# Patient Record
Sex: FEMALE | Race: WHITE | URBAN - METROPOLITAN AREA
[De-identification: names, ages, dates, MRNs, and addresses within clinical notes are randomized per-mention and may not be internally consistent; named-entity substitution may affect disease eponyms.]

---

## 2021-01-08 ENCOUNTER — OUT OF OFFICE VISIT (OUTPATIENT)
Dept: URBAN - METROPOLITAN AREA MEDICAL CENTER 34 | Facility: MEDICAL CENTER | Age: 71
End: 2021-01-08
Payer: MEDICARE

## 2021-01-08 DIAGNOSIS — R11.2 ACUTE NAUSEA WITH NONBILIOUS VOMITING: ICD-10-CM

## 2021-01-08 DIAGNOSIS — R10.84 ABDOMINAL CRAMPING, GENERALIZED: ICD-10-CM

## 2021-01-08 DIAGNOSIS — R93.3 ABN FINDINGS-GI TRACT: ICD-10-CM

## 2021-01-08 DIAGNOSIS — D72.829 ELEVATED WBC COUNT: ICD-10-CM

## 2021-01-08 PROCEDURE — G8427 DOCREV CUR MEDS BY ELIG CLIN: HCPCS | Performed by: INTERNAL MEDICINE

## 2021-01-08 PROCEDURE — 99221 1ST HOSP IP/OBS SF/LOW 40: CPT | Performed by: INTERNAL MEDICINE

## 2021-01-09 ENCOUNTER — OUT OF OFFICE VISIT (OUTPATIENT)
Dept: URBAN - METROPOLITAN AREA MEDICAL CENTER 34 | Facility: MEDICAL CENTER | Age: 71
End: 2021-01-09
Payer: MEDICARE

## 2021-01-09 DIAGNOSIS — K52.89 (LYMPHOCYTIC) MICROSCOPIC COLITIS: ICD-10-CM

## 2021-01-09 PROCEDURE — 99232 SBSQ HOSP IP/OBS MODERATE 35: CPT | Performed by: INTERNAL MEDICINE

## 2021-01-12 ENCOUNTER — OUT OF OFFICE VISIT (OUTPATIENT)
Dept: URBAN - METROPOLITAN AREA MEDICAL CENTER 34 | Facility: MEDICAL CENTER | Age: 71
End: 2021-01-12
Payer: MEDICARE

## 2021-01-12 DIAGNOSIS — K52.89 (LYMPHOCYTIC) MICROSCOPIC COLITIS: ICD-10-CM

## 2021-01-12 PROCEDURE — 99232 SBSQ HOSP IP/OBS MODERATE 35: CPT | Performed by: INTERNAL MEDICINE

## 2021-01-13 ENCOUNTER — OUT OF OFFICE VISIT (OUTPATIENT)
Dept: URBAN - METROPOLITAN AREA MEDICAL CENTER 34 | Facility: MEDICAL CENTER | Age: 71
End: 2021-01-13
Payer: MEDICARE

## 2021-01-13 DIAGNOSIS — K52.89 (LYMPHOCYTIC) MICROSCOPIC COLITIS: ICD-10-CM

## 2021-01-13 PROCEDURE — 45380 COLONOSCOPY AND BIOPSY: CPT | Performed by: INTERNAL MEDICINE

## 2021-01-19 ENCOUNTER — TELEPHONE ENCOUNTER (OUTPATIENT)
Dept: URBAN - METROPOLITAN AREA CLINIC 94 | Facility: CLINIC | Age: 71
End: 2021-01-19

## 2021-01-21 ENCOUNTER — WEB ENCOUNTER (OUTPATIENT)
Dept: URBAN - METROPOLITAN AREA CLINIC 94 | Facility: CLINIC | Age: 71
End: 2021-01-21

## 2021-01-21 ENCOUNTER — DASHBOARD ENCOUNTERS (OUTPATIENT)
Age: 71
End: 2021-01-21

## 2021-01-21 ENCOUNTER — OFFICE VISIT (OUTPATIENT)
Dept: URBAN - METROPOLITAN AREA CLINIC 94 | Facility: CLINIC | Age: 71
End: 2021-01-21
Payer: MEDICARE

## 2021-01-21 VITALS
WEIGHT: 151 LBS | HEIGHT: 65 IN | BODY MASS INDEX: 25.16 KG/M2 | TEMPERATURE: 97.3 F | SYSTOLIC BLOOD PRESSURE: 115 MMHG | HEART RATE: 88 BPM | DIASTOLIC BLOOD PRESSURE: 77 MMHG

## 2021-01-21 DIAGNOSIS — K52.9 COLITIS: ICD-10-CM

## 2021-01-21 DIAGNOSIS — R50.9 FEVER: ICD-10-CM

## 2021-01-21 DIAGNOSIS — R19.7 DIARRHEA: ICD-10-CM

## 2021-01-21 PROCEDURE — G9903 PT SCRN TBCO ID AS NON USER: HCPCS | Performed by: PHYSICIAN ASSISTANT

## 2021-01-21 PROCEDURE — G8417 CALC BMI ABV UP PARAM F/U: HCPCS | Performed by: PHYSICIAN ASSISTANT

## 2021-01-21 PROCEDURE — 3017F COLORECTAL CA SCREEN DOC REV: CPT | Performed by: PHYSICIAN ASSISTANT

## 2021-01-21 PROCEDURE — G8427 DOCREV CUR MEDS BY ELIG CLIN: HCPCS | Performed by: PHYSICIAN ASSISTANT

## 2021-01-21 PROCEDURE — 99203 OFFICE O/P NEW LOW 30 MIN: CPT | Performed by: PHYSICIAN ASSISTANT

## 2021-01-21 PROCEDURE — G8484 FLU IMMUNIZE NO ADMIN: HCPCS | Performed by: PHYSICIAN ASSISTANT

## 2021-01-21 RX ORDER — CIPROFLOXACIN HYDROCHLORIDE 500 MG/1
1 TABLET TABLET, FILM COATED ORAL
Qty: 20 TABLET | Refills: 0 | OUTPATIENT
Start: 2021-01-21 | End: 2021-01-31

## 2021-01-21 RX ORDER — METRONIDAZOLE 500 MG/1
1 TABLET TABLET, FILM COATED ORAL THREE TIMES A DAY
Qty: 30 TABLET | Refills: 0 | OUTPATIENT
Start: 2021-01-21 | End: 2021-01-31

## 2021-01-21 NOTE — HPI-TODAY'S VISIT:
The patient is being evaluated today as a [facility] [place] 2[place] hospital f/u. The patient is accompanied by her daughter. Pt reported a hx of acute onset of abdominal pain, with nausea, and vomiting, and was admitted with intractable abdominal pain. Patient reports that pain had an abrupt onset, deep pain, unable to describe her pain, only that it is a 10/10, constant with waves of intensity. Pain is diffuse, more pronounced to lower abdomen. She then developed diarrhea every hour with fevers. CT revealed colitis Patient was prescribed Flagyl and Cipro in hospital  Colonoscopy 10cm area of erythema, granularity, friability in the mid-transverse colon of unclear etiology s/p biopsy Rest of colonic mucosa normal, with biopsies taken of the cecum/AC, and left colon Internal hemorrhoids Bx Mild to moderate active colitis. Diagnostic considerations for this histologic pattern include infection (Salmonella, Shigella, Aeromonas), ischemia and inflammatory bowel disease   The patient states that since discharge the abdominal pain has not returned but the diarrhea did. She is having a BM every hour. She also reports fever as high as 102 which occur every night. She is taking Tylenol and Ibuprofen

## 2021-01-21 NOTE — PHYSICAL EXAM GASTROINTESTINAL
Abdomen , soft, mild lower abdominal tenderness present, nondistended , no guarding or rigidity , no masses palpable , normal bowel sounds , Liver and Spleen , no hepatomegaly present , no hepatosplenomegaly , liver nontender , spleen not palpable

## 2021-01-22 ENCOUNTER — TELEPHONE ENCOUNTER (OUTPATIENT)
Dept: URBAN - METROPOLITAN AREA CLINIC 94 | Facility: CLINIC | Age: 71
End: 2021-01-22

## 2021-01-22 LAB
BASO (ABSOLUTE): 0
BASOS: 0
EOS (ABSOLUTE): 0
EOS: 0
HEMATOCRIT: 36.7
HEMATOLOGY COMMENTS:: (no result)
HEMOGLOBIN: 12.3
IMMATURE CELLS: (no result)
IMMATURE GRANS (ABS): 0
IMMATURE GRANULOCYTES: 0
LYMPHS (ABSOLUTE): 1.3
LYMPHS: 18
MCH: 28.4
MCHC: 33.5
MCV: 85
MONOCYTES(ABSOLUTE): 0.6
MONOCYTES: 8
NEUTROPHILS (ABSOLUTE): 5.5
NEUTROPHILS: 74
NRBC: (no result)
PLATELETS: 277
RBC: 4.33
RDW: 12.5
WBC: 7.4

## 2021-01-23 ENCOUNTER — LAB OUTSIDE AN ENCOUNTER (OUTPATIENT)
Dept: URBAN - METROPOLITAN AREA CLINIC 94 | Facility: CLINIC | Age: 71
End: 2021-01-23

## 2021-01-27 LAB
CALPROTECTIN, STOOL - QDX: (no result)
GASTROINTESTINAL PATHOGEN: (no result)

## 2021-02-04 ENCOUNTER — OFFICE VISIT (OUTPATIENT)
Dept: URBAN - METROPOLITAN AREA CLINIC 94 | Facility: CLINIC | Age: 71
End: 2021-02-04